# Patient Record
Sex: FEMALE
[De-identification: names, ages, dates, MRNs, and addresses within clinical notes are randomized per-mention and may not be internally consistent; named-entity substitution may affect disease eponyms.]

---

## 2020-07-05 ENCOUNTER — HOSPITAL ENCOUNTER (EMERGENCY)
Age: 55
Discharge: HOME | End: 2020-07-05
Payer: COMMERCIAL

## 2020-12-19 ENCOUNTER — HOSPITAL ENCOUNTER (INPATIENT)
Dept: HOSPITAL 90 - EDH | Age: 55
Discharge: HOME | DRG: 303 | End: 2020-12-19
Attending: FAMILY MEDICINE | Admitting: FAMILY MEDICINE
Payer: COMMERCIAL

## 2020-12-19 VITALS — DIASTOLIC BLOOD PRESSURE: 74 MMHG | SYSTOLIC BLOOD PRESSURE: 141 MMHG

## 2020-12-19 VITALS — SYSTOLIC BLOOD PRESSURE: 138 MMHG | DIASTOLIC BLOOD PRESSURE: 81 MMHG

## 2020-12-19 VITALS — DIASTOLIC BLOOD PRESSURE: 89 MMHG | SYSTOLIC BLOOD PRESSURE: 150 MMHG

## 2020-12-19 VITALS — BODY MASS INDEX: 35.73 KG/M2 | HEIGHT: 60 IN | WEIGHT: 182 LBS

## 2020-12-19 DIAGNOSIS — F32.9: ICD-10-CM

## 2020-12-19 DIAGNOSIS — I10: ICD-10-CM

## 2020-12-19 DIAGNOSIS — I25.2: ICD-10-CM

## 2020-12-19 DIAGNOSIS — J45.909: ICD-10-CM

## 2020-12-19 DIAGNOSIS — Z90.49: ICD-10-CM

## 2020-12-19 DIAGNOSIS — I73.00: ICD-10-CM

## 2020-12-19 DIAGNOSIS — Z88.8: ICD-10-CM

## 2020-12-19 DIAGNOSIS — I25.119: Primary | ICD-10-CM

## 2020-12-19 DIAGNOSIS — E11.9: ICD-10-CM

## 2020-12-19 DIAGNOSIS — Z98.891: ICD-10-CM

## 2020-12-19 DIAGNOSIS — E78.5: ICD-10-CM

## 2020-12-19 DIAGNOSIS — Z90.710: ICD-10-CM

## 2020-12-19 LAB
ALBUMIN SERPL-MCNC: 3.8 G/DL (ref 3.5–5)
ALT SERPL-CCNC: 36 U/L (ref 12–78)
APTT PPP: 25.3 SEC (ref 26.3–35.5)
AST SERPL-CCNC: 22 U/L (ref 10–37)
BASOPHILS NFR BLD AUTO: 0.4 % (ref 0–5)
BILIRUB SERPL-MCNC: 0.4 MG/DL (ref 0.2–1)
BUN SERPL-MCNC: 17 MG/DL (ref 7–18)
CHLORIDE SERPL-SCNC: 102 MMOL/L (ref 101–111)
CK SERPL-CCNC: 76 U/L (ref 21–232)
CO2 SERPL-SCNC: 24 MMOL/L (ref 21–32)
CREAT SERPL-MCNC: 0.8 MG/DL (ref 0.5–1.5)
EOSINOPHIL NFR BLD AUTO: 1.6 % (ref 0–8)
ERYTHROCYTE [DISTWIDTH] IN BLOOD BY AUTOMATED COUNT: 13 % (ref 11–15.5)
GFR SERPL CREATININE-BSD FRML MDRD: 79 ML/MIN (ref 60–?)
GLUCOSE SERPL-MCNC: 134 MG/DL (ref 70–105)
HCT VFR BLD AUTO: 36.7 % (ref 36–48)
INR PPP: 0.98 (ref 0.85–1.15)
LYMPHOCYTES NFR SPEC AUTO: 17.3 % (ref 21–51)
MCH RBC QN AUTO: 28.1 PG (ref 27–33)
MCHC RBC AUTO-ENTMCNC: 33 G/DL (ref 32–36)
MCV RBC AUTO: 85.2 FL (ref 79–99)
MONOCYTES NFR BLD AUTO: 4.3 % (ref 3–13)
NEUTROPHILS NFR BLD AUTO: 75.8 % (ref 40–77)
NRBC BLD MANUAL-RTO: 0 % (ref 0–0.19)
PLATELET # BLD AUTO: 268 K/UL (ref 130–400)
POTASSIUM SERPL-SCNC: 3.8 MMOL/L (ref 3.5–5.1)
PROT SERPL-MCNC: 7.6 G/DL (ref 6–8.3)
PROTHROMBIN TIME: 10.5 SEC (ref 9.6–11.6)
RBC # BLD AUTO: 4.31 MIL/UL (ref 4–5.5)
SODIUM SERPL-SCNC: 139 MMOL/L (ref 136–145)
WBC # BLD AUTO: 12.7 K/UL (ref 4.8–10.8)

## 2020-12-19 PROCEDURE — 84484 ASSAY OF TROPONIN QUANT: CPT

## 2020-12-19 PROCEDURE — 93306 TTE W/DOPPLER COMPLETE: CPT

## 2020-12-19 PROCEDURE — 36415 COLL VENOUS BLD VENIPUNCTURE: CPT

## 2020-12-19 PROCEDURE — 94664 DEMO&/EVAL PT USE INHALER: CPT

## 2020-12-19 PROCEDURE — 85610 PROTHROMBIN TIME: CPT

## 2020-12-19 PROCEDURE — 80053 COMPREHEN METABOLIC PANEL: CPT

## 2020-12-19 PROCEDURE — 85025 COMPLETE CBC W/AUTO DIFF WBC: CPT

## 2020-12-19 PROCEDURE — 82550 ASSAY OF CK (CPK): CPT

## 2020-12-19 PROCEDURE — 93356 MYOCRD STRAIN IMG SPCKL TRCK: CPT

## 2020-12-19 PROCEDURE — 93005 ELECTROCARDIOGRAM TRACING: CPT

## 2020-12-19 PROCEDURE — 85730 THROMBOPLASTIN TIME PARTIAL: CPT

## 2020-12-19 PROCEDURE — 71045 X-RAY EXAM CHEST 1 VIEW: CPT

## 2020-12-19 NOTE — NUR
INITIAL



SW spoke with patient. Patient lives with spouse, Jonathan Pruett.  No home services.  DME: 
BPM, glucometer (uses insulin).  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  She drives and 
is able to complete ADL's independently.  PCP is Dr. Garcia. Pharmacy is [facility] located on 
[address] in [place]. DCP is home. 

-------------------------------------------------------------------------------

Addendum: 12/19/20 at 1630 by BRIGIDA VIDAL SS

-------------------------------------------------------------------------------

Amended: Links added.

## 2020-12-19 NOTE — NUR
PT AWAKE, ALERT, AND ORIENTED.  DENIES CHEST PAIN OR DISCOMFORT, NO SOB OR LABORED 
RESPIRATIONS.  DC HOME INSTRUCTIONS GIVEN TO PT, ACKNOWLEDGED ALL INFORMATION, ALL QUESTIONS 
AND CONCERNS ANSWERED.  RX GIVEN TO PT FOR ISOSORBIDE, AWARE TO FOLLOW UP WITH PCP AND DR CAMP AS OUTPATIENT FOR OUTPATIENT STRESS TEST.  PT MADE AWARE TO DIAL 911 IN CASE OF 
EMERGENCY OR TO RETURN TO ER.

## 2021-11-20 ENCOUNTER — HOSPITAL ENCOUNTER (EMERGENCY)
Dept: HOSPITAL 90 - EDH | Age: 56
Discharge: HOME | End: 2021-11-20
Payer: COMMERCIAL

## 2021-11-20 VITALS — BODY MASS INDEX: 34.76 KG/M2 | WEIGHT: 177.03 LBS | HEIGHT: 60 IN

## 2021-11-20 VITALS — DIASTOLIC BLOOD PRESSURE: 68 MMHG | SYSTOLIC BLOOD PRESSURE: 135 MMHG

## 2021-11-20 DIAGNOSIS — F32.A: ICD-10-CM
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
## 2021-11-20 DIAGNOSIS — I10: ICD-10-CM

## 2021-11-20 DIAGNOSIS — S70.01XA: ICD-10-CM

## 2021-11-20 DIAGNOSIS — E11.9: ICD-10-CM

## 2021-11-20 DIAGNOSIS — S76.011A: Primary | ICD-10-CM

## 2021-11-20 DIAGNOSIS — Z79.899: ICD-10-CM
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
## 2021-11-20 DIAGNOSIS — J45.909: ICD-10-CM

## 2021-11-20 DIAGNOSIS — E78.00: ICD-10-CM

## 2021-11-20 DIAGNOSIS — Z79.4: ICD-10-CM

## 2021-11-20 PROCEDURE — 73502 X-RAY EXAM HIP UNI 2-3 VIEWS: CPT

## 2022-04-03 ENCOUNTER — HOSPITAL ENCOUNTER (EMERGENCY)
Dept: HOSPITAL 90 - EDH | Age: 57
Discharge: HOME | End: 2022-04-03
Payer: COMMERCIAL

## 2022-04-03 VITALS — WEIGHT: 175.93 LBS | BODY MASS INDEX: 34.54 KG/M2 | HEIGHT: 60 IN

## 2022-04-03 VITALS — SYSTOLIC BLOOD PRESSURE: 143 MMHG | DIASTOLIC BLOOD PRESSURE: 85 MMHG

## 2022-04-03 DIAGNOSIS — I10: ICD-10-CM

## 2022-04-03 DIAGNOSIS — Z90.49: ICD-10-CM

## 2022-04-03 DIAGNOSIS — Z79.1: ICD-10-CM

## 2022-04-03 DIAGNOSIS — Z79.82: ICD-10-CM

## 2022-04-03 DIAGNOSIS — Z79.84: ICD-10-CM

## 2022-04-03 DIAGNOSIS — K52.9: ICD-10-CM

## 2022-04-03 DIAGNOSIS — Z90.710: ICD-10-CM

## 2022-04-03 DIAGNOSIS — Z88.1: ICD-10-CM

## 2022-04-03 DIAGNOSIS — E78.00: ICD-10-CM

## 2022-04-03 DIAGNOSIS — Z79.899: ICD-10-CM

## 2022-04-03 DIAGNOSIS — F32.A: ICD-10-CM

## 2022-04-03 DIAGNOSIS — J45.909: ICD-10-CM

## 2022-04-03 DIAGNOSIS — M54.41: Primary | ICD-10-CM

## 2022-04-03 LAB
ALBUMIN SERPL-MCNC: 3.8 G/DL (ref 3.5–5)
ALT SERPL-CCNC: 73 U/L (ref 12–78)
APTT PPP: 26.4 SEC (ref 26.3–35.5)
AST SERPL-CCNC: 53 U/L (ref 10–37)
BASOPHILS NFR BLD AUTO: 0.4 % (ref 0–5)
BILIRUB SERPL-MCNC: 0.3 MG/DL (ref 0.2–1)
BUN SERPL-MCNC: 13 MG/DL (ref 7–18)
CHLORIDE SERPL-SCNC: 97 MMOL/L (ref 101–111)
CO2 SERPL-SCNC: 27 MMOL/L (ref 21–32)
CREAT SERPL-MCNC: 0.8 MG/DL (ref 0.5–1.5)
EOSINOPHIL NFR BLD AUTO: 0.9 % (ref 0–8)
ERYTHROCYTE [DISTWIDTH] IN BLOOD BY AUTOMATED COUNT: 13.1 % (ref 11–15.5)
GFR SERPL CREATININE-BSD FRML MDRD: 78 ML/MIN (ref 60–?)
GLUCOSE SERPL-MCNC: 229 MG/DL (ref 70–105)
HCT VFR BLD AUTO: 37.6 % (ref 36–48)
HGB UR QL STRIP: (no result)
INR PPP: 0.98 (ref 0.85–1.15)
LIPASE SERPL-CCNC: 164 U/L (ref 114–286)
LYMPHOCYTES NFR SPEC AUTO: 11.4 % (ref 21–51)
MCH RBC QN AUTO: 27 PG (ref 27–33)
MCHC RBC AUTO-ENTMCNC: 32.2 G/DL (ref 32–36)
MCV RBC AUTO: 83.9 FL (ref 79–99)
MONOCYTES NFR BLD AUTO: 1.7 % (ref 3–13)
NEUTROPHILS NFR BLD AUTO: 84.5 % (ref 40–77)
NRBC BLD MANUAL-RTO: 0 % (ref 0–0.19)
PH UR STRIP: 5.5 [PH] (ref 5–8)
PLATELET # BLD AUTO: 256 K/UL (ref 130–400)
POTASSIUM SERPL-SCNC: 4.4 MMOL/L (ref 3.5–5.1)
PROT SERPL-MCNC: 7.9 G/DL (ref 6–8.3)
PROTHROMBIN TIME: 10.2 SEC (ref 9.6–11.6)
RBC # BLD AUTO: 4.48 MIL/UL (ref 4–5.5)
RBC #/AREA URNS HPF: (no result) /HPF (ref 0–1)
SODIUM SERPL-SCNC: 132 MMOL/L (ref 136–145)
SP GR UR STRIP: 1.02 (ref 1–1.03)
UROBILINOGEN UR STRIP-MCNC: 0.2 MG/DL (ref 0.2–1)
WBC # BLD AUTO: 14.9 K/UL (ref 4.8–10.8)
WBC #/AREA URNS HPF: (no result) /HPF (ref 0–1)

## 2022-04-03 PROCEDURE — 93971 EXTREMITY STUDY: CPT

## 2022-04-03 PROCEDURE — 85730 THROMBOPLASTIN TIME PARTIAL: CPT

## 2022-04-03 PROCEDURE — 74177 CT ABD & PELVIS W/CONTRAST: CPT

## 2022-04-03 PROCEDURE — 96361 HYDRATE IV INFUSION ADD-ON: CPT

## 2022-04-03 PROCEDURE — 99285 EMERGENCY DEPT VISIT HI MDM: CPT

## 2022-04-03 PROCEDURE — 96376 TX/PRO/DX INJ SAME DRUG ADON: CPT

## 2022-04-03 PROCEDURE — 80053 COMPREHEN METABOLIC PANEL: CPT

## 2022-04-03 PROCEDURE — 85025 COMPLETE CBC W/AUTO DIFF WBC: CPT

## 2022-04-03 PROCEDURE — 96375 TX/PRO/DX INJ NEW DRUG ADDON: CPT

## 2022-04-03 PROCEDURE — 83690 ASSAY OF LIPASE: CPT

## 2022-04-03 PROCEDURE — 36415 COLL VENOUS BLD VENIPUNCTURE: CPT

## 2022-04-03 PROCEDURE — 96374 THER/PROPH/DIAG INJ IV PUSH: CPT

## 2022-04-03 PROCEDURE — 85610 PROTHROMBIN TIME: CPT

## 2022-04-03 PROCEDURE — 81001 URINALYSIS AUTO W/SCOPE: CPT

## 2022-04-20 VITALS — DIASTOLIC BLOOD PRESSURE: 77 MMHG | SYSTOLIC BLOOD PRESSURE: 159 MMHG

## 2022-04-20 LAB
BASOPHILS NFR BLD AUTO: 0.4 % (ref 0–5)
BUN SERPL-MCNC: 10 MG/DL (ref 7–18)
CHLORIDE SERPL-SCNC: 104 MMOL/L (ref 101–111)
CO2 SERPL-SCNC: 24 MMOL/L (ref 21–32)
CREAT SERPL-MCNC: 0.7 MG/DL (ref 0.5–1.5)
EOSINOPHIL NFR BLD AUTO: 1.7 % (ref 0–8)
ERYTHROCYTE [DISTWIDTH] IN BLOOD BY AUTOMATED COUNT: 13.5 % (ref 11–15.5)
GFR SERPL CREATININE-BSD FRML MDRD: 91 ML/MIN (ref 60–?)
GLUCOSE SERPL-MCNC: 138 MG/DL (ref 70–105)
HCT VFR BLD AUTO: 38.6 % (ref 36–48)
LYMPHOCYTES NFR SPEC AUTO: 18.3 % (ref 21–51)
MCH RBC QN AUTO: 27.4 PG (ref 27–33)
MCHC RBC AUTO-ENTMCNC: 31.3 G/DL (ref 32–36)
MCV RBC AUTO: 87.3 FL (ref 79–99)
MONOCYTES NFR BLD AUTO: 4.6 % (ref 3–13)
NEUTROPHILS NFR BLD AUTO: 74.4 % (ref 40–77)
NRBC BLD MANUAL-RTO: 0 % (ref 0–0.19)
PLATELET # BLD AUTO: 287 K/UL (ref 130–400)
POTASSIUM SERPL-SCNC: 3.7 MMOL/L (ref 3.5–5.1)
RBC # BLD AUTO: 4.42 MIL/UL (ref 4–5.5)
SODIUM SERPL-SCNC: 137 MMOL/L (ref 136–145)
WBC # BLD AUTO: 15.7 K/UL (ref 4.8–10.8)

## 2022-04-21 ENCOUNTER — HOSPITAL ENCOUNTER (OUTPATIENT)
Dept: HOSPITAL 90 - DAH | Age: 57
Discharge: HOME | End: 2022-04-21
Attending: NEUROLOGICAL SURGERY
Payer: COMMERCIAL

## 2022-04-21 VITALS — DIASTOLIC BLOOD PRESSURE: 57 MMHG | SYSTOLIC BLOOD PRESSURE: 104 MMHG

## 2022-04-21 VITALS — SYSTOLIC BLOOD PRESSURE: 107 MMHG | DIASTOLIC BLOOD PRESSURE: 43 MMHG

## 2022-04-21 VITALS — SYSTOLIC BLOOD PRESSURE: 96 MMHG | DIASTOLIC BLOOD PRESSURE: 38 MMHG

## 2022-04-21 VITALS — DIASTOLIC BLOOD PRESSURE: 42 MMHG | SYSTOLIC BLOOD PRESSURE: 95 MMHG

## 2022-04-21 VITALS — SYSTOLIC BLOOD PRESSURE: 110 MMHG | DIASTOLIC BLOOD PRESSURE: 65 MMHG

## 2022-04-21 VITALS — DIASTOLIC BLOOD PRESSURE: 43 MMHG | SYSTOLIC BLOOD PRESSURE: 98 MMHG

## 2022-04-21 VITALS — HEIGHT: 60 IN | BODY MASS INDEX: 33.89 KG/M2 | WEIGHT: 172.6 LBS

## 2022-04-21 VITALS — SYSTOLIC BLOOD PRESSURE: 92 MMHG | DIASTOLIC BLOOD PRESSURE: 47 MMHG

## 2022-04-21 VITALS — SYSTOLIC BLOOD PRESSURE: 116 MMHG | DIASTOLIC BLOOD PRESSURE: 60 MMHG

## 2022-04-21 VITALS — SYSTOLIC BLOOD PRESSURE: 111 MMHG | DIASTOLIC BLOOD PRESSURE: 65 MMHG

## 2022-04-21 VITALS — DIASTOLIC BLOOD PRESSURE: 41 MMHG | SYSTOLIC BLOOD PRESSURE: 92 MMHG

## 2022-04-21 VITALS — SYSTOLIC BLOOD PRESSURE: 113 MMHG | DIASTOLIC BLOOD PRESSURE: 47 MMHG

## 2022-04-21 VITALS — DIASTOLIC BLOOD PRESSURE: 64 MMHG | SYSTOLIC BLOOD PRESSURE: 122 MMHG

## 2022-04-21 VITALS — DIASTOLIC BLOOD PRESSURE: 41 MMHG | SYSTOLIC BLOOD PRESSURE: 95 MMHG

## 2022-04-21 DIAGNOSIS — E11.9: ICD-10-CM

## 2022-04-21 DIAGNOSIS — Z79.01: ICD-10-CM

## 2022-04-21 DIAGNOSIS — Z98.890: ICD-10-CM

## 2022-04-21 DIAGNOSIS — Z79.82: ICD-10-CM

## 2022-04-21 DIAGNOSIS — M53.3: Primary | ICD-10-CM

## 2022-04-21 DIAGNOSIS — I10: ICD-10-CM

## 2022-04-21 DIAGNOSIS — E66.9: ICD-10-CM

## 2022-04-21 DIAGNOSIS — Z79.899: ICD-10-CM

## 2022-04-21 PROCEDURE — 82948 REAGENT STRIP/BLOOD GLUCOSE: CPT

## 2022-04-21 PROCEDURE — 36415 COLL VENOUS BLD VENIPUNCTURE: CPT

## 2022-04-21 PROCEDURE — 80048 BASIC METABOLIC PNL TOTAL CA: CPT

## 2022-04-21 PROCEDURE — 87635 SARS-COV-2 COVID-19 AMP PRB: CPT

## 2022-04-21 PROCEDURE — 85025 COMPLETE CBC W/AUTO DIFF WBC: CPT

## 2022-04-21 PROCEDURE — 27096 INJECT SACROILIAC JOINT: CPT

## 2022-04-21 PROCEDURE — 72202 X-RAY EXAM SI JOINTS 3/> VWS: CPT

## 2022-06-01 ENCOUNTER — HOSPITAL ENCOUNTER (EMERGENCY)
Dept: HOSPITAL 90 - EDH | Age: 57
Discharge: HOME | End: 2022-06-01
Payer: COMMERCIAL

## 2022-06-01 VITALS — HEIGHT: 60 IN | BODY MASS INDEX: 33.37 KG/M2 | WEIGHT: 169.98 LBS

## 2022-06-01 VITALS — SYSTOLIC BLOOD PRESSURE: 135 MMHG | DIASTOLIC BLOOD PRESSURE: 76 MMHG

## 2022-06-01 DIAGNOSIS — Z88.5: ICD-10-CM

## 2022-06-01 DIAGNOSIS — Z79.899: ICD-10-CM

## 2022-06-01 DIAGNOSIS — Z79.84: ICD-10-CM

## 2022-06-01 DIAGNOSIS — M46.1: ICD-10-CM
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
## 2022-06-01 DIAGNOSIS — Z88.1: ICD-10-CM

## 2022-06-01 DIAGNOSIS — S00.83XA: Primary | ICD-10-CM
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
## 2022-06-01 DIAGNOSIS — Z79.1: ICD-10-CM

## 2022-06-01 DIAGNOSIS — M19.90: ICD-10-CM

## 2022-06-01 DIAGNOSIS — S30.1XXA: ICD-10-CM

## 2022-06-01 DIAGNOSIS — E11.9: ICD-10-CM

## 2022-06-01 DIAGNOSIS — E78.00: ICD-10-CM

## 2022-06-01 DIAGNOSIS — I10: ICD-10-CM

## 2022-06-01 DIAGNOSIS — Z79.82: ICD-10-CM

## 2022-06-01 PROCEDURE — 74176 CT ABD & PELVIS W/O CONTRAST: CPT

## 2022-06-01 PROCEDURE — 96372 THER/PROPH/DIAG INJ SC/IM: CPT

## 2022-06-01 PROCEDURE — 99284 EMERGENCY DEPT VISIT MOD MDM: CPT

## 2022-06-29 ENCOUNTER — HOSPITAL ENCOUNTER (EMERGENCY)
Dept: HOSPITAL 90 - EDH | Age: 57
Discharge: HOME | End: 2022-06-29
Payer: COMMERCIAL

## 2022-06-29 VITALS — SYSTOLIC BLOOD PRESSURE: 159 MMHG | DIASTOLIC BLOOD PRESSURE: 80 MMHG

## 2022-06-29 VITALS — HEIGHT: 60 IN | BODY MASS INDEX: 33.59 KG/M2 | WEIGHT: 171.08 LBS

## 2022-06-29 DIAGNOSIS — Z20.822: ICD-10-CM

## 2022-06-29 DIAGNOSIS — K52.9: Primary | ICD-10-CM

## 2022-06-29 LAB
ALBUMIN SERPL-MCNC: 4 G/DL (ref 3.5–5)
ALT SERPL-CCNC: 65 U/L (ref 12–78)
APPEARANCE UR: CLEAR
AST SERPL-CCNC: 48 U/L (ref 10–37)
BASOPHILS NFR BLD AUTO: 0.4 % (ref 0–5)
BILIRUB SERPL-MCNC: 0.4 MG/DL (ref 0.2–1)
BILIRUB UR QL STRIP: NEGATIVE
BUN SERPL-MCNC: 13 MG/DL (ref 7–18)
CHLORIDE SERPL-SCNC: 107 MMOL/L (ref 101–111)
CO2 SERPL-SCNC: 28 MMOL/L (ref 21–32)
COLOR UR: YELLOW
CREAT SERPL-MCNC: 0.8 MG/DL (ref 0.5–1.5)
EOSINOPHIL NFR BLD AUTO: 0.9 % (ref 0–8)
ERYTHROCYTE [DISTWIDTH] IN BLOOD BY AUTOMATED COUNT: 13.8 % (ref 11–15.5)
GFR SERPL CREATININE-BSD FRML MDRD: 78 ML/MIN (ref 60–?)
GLUCOSE SERPL-MCNC: 127 MG/DL (ref 70–105)
GLUCOSE UR STRIP-MCNC: NEGATIVE MG/DL
HCT VFR BLD AUTO: 38.9 % (ref 36–48)
HGB UR QL STRIP: NEGATIVE
KETONES UR STRIP-MCNC: NEGATIVE MG/DL
LEUKOCYTE ESTERASE UR QL STRIP: NEGATIVE
LIPASE SERPL-CCNC: 133 U/L (ref 114–286)
LYMPHOCYTES NFR SPEC AUTO: 12.3 % (ref 21–51)
MCH RBC QN AUTO: 28.1 PG (ref 27–33)
MCHC RBC AUTO-ENTMCNC: 32.4 G/DL (ref 32–36)
MCV RBC AUTO: 86.8 FL (ref 79–99)
MONOCYTES NFR BLD AUTO: 3.5 % (ref 3–13)
NEUTROPHILS NFR BLD AUTO: 82 % (ref 40–77)
NITRITE UR QL STRIP: NEGATIVE
NRBC BLD MANUAL-RTO: 0 % (ref 0–0.19)
PH UR STRIP: 5 [PH] (ref 5–8)
PLATELET # BLD AUTO: 312 K/UL (ref 130–400)
POTASSIUM SERPL-SCNC: 4 MMOL/L (ref 3.5–5.1)
PROT SERPL-MCNC: 8 G/DL (ref 6–8.3)
PROT UR QL STRIP: (no result) MG/DL
RBC # BLD AUTO: 4.48 MIL/UL (ref 4–5.5)
RBC #/AREA URNS HPF: (no result) /HPF (ref 0–1)
SODIUM SERPL-SCNC: 145 MMOL/L (ref 136–145)
SP GR UR STRIP: 1.03 (ref 1–1.03)
UROBILINOGEN UR STRIP-MCNC: 0.2 MG/DL (ref 0.2–1)
WBC # BLD AUTO: 16.4 K/UL (ref 4.8–10.8)
WBC #/AREA URNS HPF: (no result) /HPF (ref 0–1)

## 2022-06-29 PROCEDURE — 99284 EMERGENCY DEPT VISIT MOD MDM: CPT

## 2022-06-29 PROCEDURE — 85025 COMPLETE CBC W/AUTO DIFF WBC: CPT

## 2022-06-29 PROCEDURE — 96372 THER/PROPH/DIAG INJ SC/IM: CPT

## 2022-06-29 PROCEDURE — 83690 ASSAY OF LIPASE: CPT

## 2022-06-29 PROCEDURE — 96375 TX/PRO/DX INJ NEW DRUG ADDON: CPT

## 2022-06-29 PROCEDURE — 87324 CLOSTRIDIUM AG IA: CPT

## 2022-06-29 PROCEDURE — 96374 THER/PROPH/DIAG INJ IV PUSH: CPT

## 2022-06-29 PROCEDURE — 83630 LACTOFERRIN FECAL (QUAL): CPT

## 2022-06-29 PROCEDURE — 87635 SARS-COV-2 COVID-19 AMP PRB: CPT

## 2022-06-29 PROCEDURE — 80053 COMPREHEN METABOLIC PANEL: CPT

## 2022-06-29 PROCEDURE — 36415 COLL VENOUS BLD VENIPUNCTURE: CPT

## 2022-06-29 PROCEDURE — 87804 INFLUENZA ASSAY W/OPTIC: CPT

## 2022-06-29 PROCEDURE — 96361 HYDRATE IV INFUSION ADD-ON: CPT

## 2022-06-29 PROCEDURE — 82270 OCCULT BLOOD FECES: CPT

## 2022-06-29 PROCEDURE — 81001 URINALYSIS AUTO W/SCOPE: CPT

## 2022-07-05 ENCOUNTER — HOSPITAL ENCOUNTER (OUTPATIENT)
Dept: HOSPITAL 90 - LAB | Age: 57
Discharge: HOME | End: 2022-07-05
Attending: INTERNAL MEDICINE
Payer: COMMERCIAL

## 2022-07-05 DIAGNOSIS — R11.2: ICD-10-CM

## 2022-07-05 DIAGNOSIS — A04.72: Primary | ICD-10-CM

## 2022-07-05 DIAGNOSIS — R19.7: ICD-10-CM

## 2022-07-05 PROCEDURE — 82270 OCCULT BLOOD FECES: CPT

## 2022-08-30 VITALS — SYSTOLIC BLOOD PRESSURE: 180 MMHG | DIASTOLIC BLOOD PRESSURE: 91 MMHG

## 2022-08-31 ENCOUNTER — HOSPITAL ENCOUNTER (OUTPATIENT)
Dept: HOSPITAL 90 - EDH | Age: 57
Setting detail: OBSERVATION
LOS: 2 days | Discharge: HOME | End: 2022-09-02
Attending: INTERNAL MEDICINE | Admitting: INTERNAL MEDICINE
Payer: COMMERCIAL

## 2022-08-31 ENCOUNTER — HOSPITAL ENCOUNTER (OUTPATIENT)
Dept: HOSPITAL 90 - DAH | Age: 57
Discharge: HOME | End: 2022-08-31
Attending: NEUROLOGICAL SURGERY
Payer: COMMERCIAL

## 2022-08-31 VITALS — DIASTOLIC BLOOD PRESSURE: 54 MMHG | SYSTOLIC BLOOD PRESSURE: 108 MMHG

## 2022-08-31 VITALS — DIASTOLIC BLOOD PRESSURE: 61 MMHG | SYSTOLIC BLOOD PRESSURE: 98 MMHG

## 2022-08-31 VITALS — SYSTOLIC BLOOD PRESSURE: 131 MMHG | DIASTOLIC BLOOD PRESSURE: 72 MMHG

## 2022-08-31 VITALS — DIASTOLIC BLOOD PRESSURE: 58 MMHG | SYSTOLIC BLOOD PRESSURE: 109 MMHG

## 2022-08-31 VITALS — HEIGHT: 60 IN | BODY MASS INDEX: 32.98 KG/M2 | WEIGHT: 168 LBS

## 2022-08-31 VITALS — WEIGHT: 173 LBS | HEIGHT: 60 IN | BODY MASS INDEX: 33.96 KG/M2

## 2022-08-31 VITALS — SYSTOLIC BLOOD PRESSURE: 142 MMHG | DIASTOLIC BLOOD PRESSURE: 78 MMHG

## 2022-08-31 VITALS — SYSTOLIC BLOOD PRESSURE: 108 MMHG | DIASTOLIC BLOOD PRESSURE: 58 MMHG

## 2022-08-31 VITALS — DIASTOLIC BLOOD PRESSURE: 75 MMHG | SYSTOLIC BLOOD PRESSURE: 135 MMHG

## 2022-08-31 VITALS — DIASTOLIC BLOOD PRESSURE: 59 MMHG | SYSTOLIC BLOOD PRESSURE: 113 MMHG

## 2022-08-31 VITALS — SYSTOLIC BLOOD PRESSURE: 149 MMHG | DIASTOLIC BLOOD PRESSURE: 77 MMHG

## 2022-08-31 VITALS — SYSTOLIC BLOOD PRESSURE: 107 MMHG | DIASTOLIC BLOOD PRESSURE: 60 MMHG

## 2022-08-31 VITALS — SYSTOLIC BLOOD PRESSURE: 109 MMHG | DIASTOLIC BLOOD PRESSURE: 59 MMHG

## 2022-08-31 VITALS — SYSTOLIC BLOOD PRESSURE: 119 MMHG | DIASTOLIC BLOOD PRESSURE: 65 MMHG

## 2022-08-31 VITALS — SYSTOLIC BLOOD PRESSURE: 111 MMHG | DIASTOLIC BLOOD PRESSURE: 56 MMHG

## 2022-08-31 VITALS — SYSTOLIC BLOOD PRESSURE: 97 MMHG | DIASTOLIC BLOOD PRESSURE: 56 MMHG

## 2022-08-31 VITALS — SYSTOLIC BLOOD PRESSURE: 106 MMHG | DIASTOLIC BLOOD PRESSURE: 54 MMHG

## 2022-08-31 VITALS — DIASTOLIC BLOOD PRESSURE: 69 MMHG | SYSTOLIC BLOOD PRESSURE: 123 MMHG

## 2022-08-31 DIAGNOSIS — F32.9: ICD-10-CM

## 2022-08-31 DIAGNOSIS — Z79.899: ICD-10-CM

## 2022-08-31 DIAGNOSIS — Z98.890: ICD-10-CM

## 2022-08-31 DIAGNOSIS — I10: ICD-10-CM

## 2022-08-31 DIAGNOSIS — J45.909: ICD-10-CM

## 2022-08-31 DIAGNOSIS — E78.5: ICD-10-CM

## 2022-08-31 DIAGNOSIS — M75.101: ICD-10-CM

## 2022-08-31 DIAGNOSIS — M54.13: Primary | ICD-10-CM

## 2022-08-31 DIAGNOSIS — T38.0X5A: ICD-10-CM

## 2022-08-31 DIAGNOSIS — G43.909: ICD-10-CM

## 2022-08-31 DIAGNOSIS — M53.3: Primary | ICD-10-CM

## 2022-08-31 DIAGNOSIS — Z79.82: ICD-10-CM

## 2022-08-31 DIAGNOSIS — I25.10: ICD-10-CM

## 2022-08-31 DIAGNOSIS — E09.9: ICD-10-CM

## 2022-08-31 DIAGNOSIS — E11.9: ICD-10-CM

## 2022-08-31 DIAGNOSIS — Z90.710: ICD-10-CM

## 2022-08-31 DIAGNOSIS — M19.90: ICD-10-CM

## 2022-08-31 DIAGNOSIS — Z98.891: ICD-10-CM

## 2022-08-31 LAB
ALBUMIN SERPL-MCNC: 3.7 G/DL (ref 3.5–5)
ALT SERPL-CCNC: 43 U/L (ref 12–78)
APPEARANCE UR: CLEAR
AST SERPL-CCNC: 24 U/L (ref 10–37)
BASOPHILS NFR BLD AUTO: 0.3 % (ref 0–5)
BILIRUB UR QL STRIP: NEGATIVE
BUN SERPL-MCNC: 15 MG/DL (ref 7–18)
CHLORIDE SERPL-SCNC: 103 MMOL/L (ref 101–111)
CK SERPL-CCNC: 229 U/L (ref 21–232)
CO2 SERPL-SCNC: 24 MMOL/L (ref 21–32)
COLOR UR: YELLOW
CREAT SERPL-MCNC: 1 MG/DL (ref 0.5–1.5)
CRP SERPL HS-MCNC: 15.2 MG/L (ref 0–9)
EOSINOPHIL NFR BLD AUTO: 0.6 % (ref 0–8)
ERYTHROCYTE [DISTWIDTH] IN BLOOD BY AUTOMATED COUNT: 13.3 % (ref 11–15.5)
GFR SERPL CREATININE-BSD FRML MDRD: 61 ML/MIN (ref 60–?)
GLUCOSE SERPL-MCNC: 209 MG/DL (ref 70–105)
GLUCOSE UR STRIP-MCNC: NEGATIVE MG/DL
HCT VFR BLD AUTO: 35 % (ref 36–48)
HGB UR QL STRIP: (no result)
KETONES UR STRIP-MCNC: NEGATIVE MG/DL
LEUKOCYTE ESTERASE UR QL STRIP: NEGATIVE
LYMPHOCYTES NFR SPEC AUTO: 10.7 % (ref 21–51)
MCH RBC QN AUTO: 28 PG (ref 27–33)
MCHC RBC AUTO-ENTMCNC: 32.6 G/DL (ref 32–36)
MCV RBC AUTO: 86 FL (ref 79–99)
MONOCYTES NFR BLD AUTO: 2.9 % (ref 3–13)
MYOGLOBIN SERPL-MCNC: 113 NG/ML (ref 10–92)
NEUTROPHILS NFR BLD AUTO: 84.9 % (ref 40–77)
NITRITE UR QL STRIP: NEGATIVE
NRBC BLD MANUAL-RTO: 0 % (ref 0–0.19)
PH UR STRIP: 6 [PH] (ref 5–8)
PLATELET # BLD AUTO: 235 K/UL (ref 130–400)
POTASSIUM SERPL-SCNC: 3.7 MMOL/L (ref 3.5–5.1)
PROT SERPL-MCNC: 7 G/DL (ref 6–8.3)
PROT UR QL STRIP: NEGATIVE MG/DL
RBC # BLD AUTO: 4.07 MIL/UL (ref 4–5.5)
RBC #/AREA URNS HPF: (no result) /HPF (ref 0–1)
SODIUM SERPL-SCNC: 139 MMOL/L (ref 136–145)
SP GR UR STRIP: 1.02 (ref 1–1.03)
UROBILINOGEN UR STRIP-MCNC: 0.2 MG/DL (ref 0.2–1)
WBC # BLD AUTO: 15.6 K/UL (ref 4.8–10.8)
WBC #/AREA URNS HPF: (no result) /HPF (ref 0–1)

## 2022-08-31 PROCEDURE — 87426 SARSCOV CORONAVIRUS AG IA: CPT

## 2022-08-31 PROCEDURE — 72202 X-RAY EXAM SI JOINTS 3/> VWS: CPT

## 2022-08-31 PROCEDURE — 27096 INJECT SACROILIAC JOINT: CPT

## 2022-08-31 PROCEDURE — 82550 ASSAY OF CK (CPK): CPT

## 2022-08-31 PROCEDURE — 81001 URINALYSIS AUTO W/SCOPE: CPT

## 2022-08-31 PROCEDURE — 71045 X-RAY EXAM CHEST 1 VIEW: CPT

## 2022-08-31 PROCEDURE — 73200 CT UPPER EXTREMITY W/O DYE: CPT

## 2022-08-31 PROCEDURE — 85025 COMPLETE CBC W/AUTO DIFF WBC: CPT

## 2022-08-31 PROCEDURE — 96374 THER/PROPH/DIAG INJ IV PUSH: CPT

## 2022-08-31 PROCEDURE — 73221 MRI JOINT UPR EXTREM W/O DYE: CPT

## 2022-08-31 PROCEDURE — 96361 HYDRATE IV INFUSION ADD-ON: CPT

## 2022-08-31 PROCEDURE — 72040 X-RAY EXAM NECK SPINE 2-3 VW: CPT

## 2022-08-31 PROCEDURE — 82948 REAGENT STRIP/BLOOD GLUCOSE: CPT

## 2022-08-31 PROCEDURE — 72141 MRI NECK SPINE W/O DYE: CPT

## 2022-08-31 PROCEDURE — 71250 CT THORAX DX C-: CPT

## 2022-08-31 PROCEDURE — 96372 THER/PROPH/DIAG INJ SC/IM: CPT

## 2022-08-31 PROCEDURE — 93005 ELECTROCARDIOGRAM TRACING: CPT

## 2022-08-31 PROCEDURE — 72070 X-RAY EXAM THORAC SPINE 2VWS: CPT

## 2022-08-31 PROCEDURE — 72146 MRI CHEST SPINE W/O DYE: CPT

## 2022-08-31 PROCEDURE — 86431 RHEUMATOID FACTOR QUANT: CPT

## 2022-08-31 PROCEDURE — 83874 ASSAY OF MYOGLOBIN: CPT

## 2022-08-31 PROCEDURE — 76641 ULTRASOUND BREAST COMPLETE: CPT

## 2022-08-31 PROCEDURE — 96376 TX/PRO/DX INJ SAME DRUG ADON: CPT

## 2022-08-31 PROCEDURE — 84484 ASSAY OF TROPONIN QUANT: CPT

## 2022-08-31 PROCEDURE — 36415 COLL VENOUS BLD VENIPUNCTURE: CPT

## 2022-08-31 PROCEDURE — 86140 C-REACTIVE PROTEIN: CPT

## 2022-08-31 PROCEDURE — 85651 RBC SED RATE NONAUTOMATED: CPT

## 2022-08-31 PROCEDURE — 80053 COMPREHEN METABOLIC PANEL: CPT

## 2022-08-31 PROCEDURE — 96375 TX/PRO/DX INJ NEW DRUG ADDON: CPT

## 2022-08-31 RX ADMIN — SODIUM CHLORIDE SCH UNIT: 9 INJECTION, SOLUTION INTRAVENOUS at 21:05

## 2022-08-31 RX ADMIN — SODIUM CHLORIDE SCH UNIT: 9 INJECTION, SOLUTION INTRAVENOUS at 16:30

## 2022-08-31 RX ADMIN — SODIUM CHLORIDE SCH MLS/HR: 0.45 INJECTION, SOLUTION INTRAVENOUS at 16:07

## 2022-09-01 VITALS — DIASTOLIC BLOOD PRESSURE: 92 MMHG | SYSTOLIC BLOOD PRESSURE: 167 MMHG

## 2022-09-01 VITALS — DIASTOLIC BLOOD PRESSURE: 87 MMHG | SYSTOLIC BLOOD PRESSURE: 166 MMHG

## 2022-09-01 VITALS — DIASTOLIC BLOOD PRESSURE: 81 MMHG | SYSTOLIC BLOOD PRESSURE: 143 MMHG

## 2022-09-01 VITALS — SYSTOLIC BLOOD PRESSURE: 166 MMHG | DIASTOLIC BLOOD PRESSURE: 87 MMHG

## 2022-09-01 VITALS — DIASTOLIC BLOOD PRESSURE: 72 MMHG | SYSTOLIC BLOOD PRESSURE: 146 MMHG

## 2022-09-01 VITALS — DIASTOLIC BLOOD PRESSURE: 91 MMHG | SYSTOLIC BLOOD PRESSURE: 171 MMHG

## 2022-09-01 VITALS — SYSTOLIC BLOOD PRESSURE: 150 MMHG | DIASTOLIC BLOOD PRESSURE: 82 MMHG

## 2022-09-01 LAB
ALBUMIN SERPL-MCNC: 3.5 G/DL (ref 3.5–5)
ALT SERPL-CCNC: 35 U/L (ref 12–78)
AST SERPL-CCNC: 15 U/L (ref 10–37)
BASOPHILS NFR BLD AUTO: 0.2 % (ref 0–5)
BUN SERPL-MCNC: 16 MG/DL (ref 7–18)
CHLORIDE SERPL-SCNC: 107 MMOL/L (ref 101–111)
CO2 SERPL-SCNC: 25 MMOL/L (ref 21–32)
CREAT SERPL-MCNC: 0.9 MG/DL (ref 0.5–1.5)
EOSINOPHIL NFR BLD AUTO: 0.1 % (ref 0–8)
ERYTHROCYTE [DISTWIDTH] IN BLOOD BY AUTOMATED COUNT: 13 % (ref 11–15.5)
GFR SERPL CREATININE-BSD FRML MDRD: 68 ML/MIN (ref 60–?)
GLUCOSE SERPL-MCNC: 167 MG/DL (ref 70–105)
HCT VFR BLD AUTO: 34.6 % (ref 36–48)
LYMPHOCYTES NFR SPEC AUTO: 9.2 % (ref 21–51)
MCH RBC QN AUTO: 27.9 PG (ref 27–33)
MCHC RBC AUTO-ENTMCNC: 32.9 G/DL (ref 32–36)
MCV RBC AUTO: 84.8 FL (ref 79–99)
MONOCYTES NFR BLD AUTO: 3.8 % (ref 3–13)
NEUTROPHILS NFR BLD AUTO: 85.6 % (ref 40–77)
NRBC BLD MANUAL-RTO: 0 % (ref 0–0.19)
PLATELET # BLD AUTO: 240 K/UL (ref 130–400)
POTASSIUM SERPL-SCNC: 4 MMOL/L (ref 3.5–5.1)
PROT SERPL-MCNC: 7 G/DL (ref 6–8.3)
RBC # BLD AUTO: 4.08 MIL/UL (ref 4–5.5)
SODIUM SERPL-SCNC: 142 MMOL/L (ref 136–145)
WBC # BLD AUTO: 16.7 K/UL (ref 4.8–10.8)

## 2022-09-01 RX ADMIN — SODIUM CHLORIDE SCH MLS/HR: 0.45 INJECTION, SOLUTION INTRAVENOUS at 02:06

## 2022-09-01 RX ADMIN — SODIUM CHLORIDE SCH UNIT: 9 INJECTION, SOLUTION INTRAVENOUS at 11:44

## 2022-09-01 RX ADMIN — Medication SCH MG: at 21:00

## 2022-09-01 RX ADMIN — SODIUM CHLORIDE SCH UNIT: 9 INJECTION, SOLUTION INTRAVENOUS at 07:30

## 2022-09-01 RX ADMIN — SODIUM CHLORIDE SCH UNIT: 9 INJECTION, SOLUTION INTRAVENOUS at 15:29

## 2022-09-01 RX ADMIN — SODIUM CHLORIDE SCH UNIT: 9 INJECTION, SOLUTION INTRAVENOUS at 21:06

## 2022-09-01 RX ADMIN — METHYLPREDNISOLONE SODIUM SUCCINATE SCH MG: 125 INJECTION, POWDER, FOR SOLUTION INTRAMUSCULAR; INTRAVENOUS at 08:48

## 2022-09-01 RX ADMIN — GLIMEPIRIDE SCH MG: 2 TABLET ORAL at 21:02

## 2022-09-01 RX ADMIN — SODIUM CHLORIDE SCH MLS/HR: 0.45 INJECTION, SOLUTION INTRAVENOUS at 19:30

## 2022-09-01 RX ADMIN — KETOROLAC TROMETHAMINE SCH MG: 30 INJECTION, SOLUTION INTRAMUSCULAR; INTRAVENOUS at 21:42

## 2022-09-02 VITALS — SYSTOLIC BLOOD PRESSURE: 148 MMHG | DIASTOLIC BLOOD PRESSURE: 76 MMHG

## 2022-09-02 RX ADMIN — METHYLPREDNISOLONE SODIUM SUCCINATE SCH MG: 125 INJECTION, POWDER, FOR SOLUTION INTRAMUSCULAR; INTRAVENOUS at 08:59

## 2022-09-02 RX ADMIN — Medication SCH MG: at 09:00

## 2022-09-02 RX ADMIN — GLIMEPIRIDE SCH MG: 2 TABLET ORAL at 09:00

## 2022-09-02 RX ADMIN — SODIUM CHLORIDE SCH UNIT: 9 INJECTION, SOLUTION INTRAVENOUS at 06:08

## 2022-09-02 RX ADMIN — KETOROLAC TROMETHAMINE SCH MG: 30 INJECTION, SOLUTION INTRAMUSCULAR; INTRAVENOUS at 09:00

## 2022-09-02 RX ADMIN — SODIUM CHLORIDE SCH MLS/HR: 0.45 INJECTION, SOLUTION INTRAVENOUS at 01:14

## 2022-10-26 ENCOUNTER — HOSPITAL ENCOUNTER (INPATIENT)
Dept: HOSPITAL 90 - EDH | Age: 57
LOS: 1 days | Discharge: HOME | DRG: 74 | End: 2022-10-27
Attending: INTERNAL MEDICINE | Admitting: INTERNAL MEDICINE
Payer: COMMERCIAL

## 2022-10-26 VITALS — HEIGHT: 60 IN | BODY MASS INDEX: 34.24 KG/M2 | WEIGHT: 174.4 LBS

## 2022-10-26 VITALS — DIASTOLIC BLOOD PRESSURE: 97 MMHG | SYSTOLIC BLOOD PRESSURE: 182 MMHG

## 2022-10-26 DIAGNOSIS — F32.A: ICD-10-CM

## 2022-10-26 DIAGNOSIS — G40.909: ICD-10-CM

## 2022-10-26 DIAGNOSIS — I10: ICD-10-CM

## 2022-10-26 DIAGNOSIS — M62.838: ICD-10-CM

## 2022-10-26 DIAGNOSIS — Z90.710: ICD-10-CM

## 2022-10-26 DIAGNOSIS — J45.909: ICD-10-CM

## 2022-10-26 DIAGNOSIS — M54.12: Primary | ICD-10-CM

## 2022-10-26 DIAGNOSIS — E78.00: ICD-10-CM

## 2022-10-26 DIAGNOSIS — E11.9: ICD-10-CM

## 2022-10-26 LAB
ALBUMIN SERPL-MCNC: 3.8 G/DL (ref 3.5–5)
ALT SERPL-CCNC: 32 U/L (ref 12–78)
APPEARANCE UR: CLEAR
AST SERPL-CCNC: 22 U/L (ref 10–37)
BASOPHILS NFR BLD AUTO: 0.4 % (ref 0–5)
BILIRUB UR QL STRIP: NEGATIVE MG/DL
BUN SERPL-MCNC: 11 MG/DL (ref 7–18)
CHLORIDE SERPL-SCNC: 102 MMOL/L (ref 101–111)
CK SERPL-CCNC: 58 U/L (ref 21–232)
CO2 SERPL-SCNC: 24 MMOL/L (ref 21–32)
COLOR UR: YELLOW
CREAT SERPL-MCNC: 0.7 MG/DL (ref 0.5–1.5)
CRP SERPL HS-MCNC: 9.9 MG/L (ref 0–9)
EOSINOPHIL NFR BLD AUTO: 1.4 % (ref 0–8)
ERYTHROCYTE [DISTWIDTH] IN BLOOD BY AUTOMATED COUNT: 14.3 % (ref 11–15.5)
GFR SERPL CREATININE-BSD FRML MDRD: 91 ML/MIN (ref 60–?)
GLUCOSE SERPL-MCNC: 125 MG/DL (ref 70–105)
GLUCOSE UR STRIP-MCNC: NEGATIVE MG/DL
HCT VFR BLD AUTO: 36.1 % (ref 36–48)
HGB UR QL STRIP: NEGATIVE
HYALINE CASTS URNS QL MICRO: (no result) /LPF
KETONES UR STRIP-MCNC: NEGATIVE MG/DL
LEUKOCYTE ESTERASE UR QL STRIP: NEGATIVE LEU/UL
LYMPHOCYTES NFR SPEC AUTO: 19.7 % (ref 21–51)
MCH RBC QN AUTO: 28.2 PG (ref 27–33)
MCHC RBC AUTO-ENTMCNC: 33.2 G/DL (ref 32–36)
MCV RBC AUTO: 84.9 FL (ref 79–99)
MONOCYTES NFR BLD AUTO: 4.4 % (ref 3–13)
NEUTROPHILS NFR BLD AUTO: 73.1 % (ref 40–77)
NITRITE UR QL STRIP: NEGATIVE
NRBC BLD MANUAL-RTO: 0 % (ref 0–0.19)
PH UR STRIP: 6 [PH] (ref 5–8)
PLATELET # BLD AUTO: 269 K/UL (ref 130–400)
POTASSIUM SERPL-SCNC: 4.1 MMOL/L (ref 3.5–5.1)
PROT SERPL-MCNC: 7.6 G/DL (ref 6–8.3)
PROT UR QL STRIP: NEGATIVE MG/DL
RBC # BLD AUTO: 4.25 MIL/UL (ref 4–5.5)
RBC #/AREA URNS HPF: (no result) /HPF (ref 0–1)
SODIUM SERPL-SCNC: 137 MMOL/L (ref 136–145)
SP GR UR STRIP: 1.04 (ref 1–1.03)
UROBILINOGEN UR STRIP-MCNC: 0.2 MG/DL (ref 0.2–1)
WBC # BLD AUTO: 12.3 K/UL (ref 4.8–10.8)
WBC #/AREA URNS HPF: (no result) /HPF (ref 0–1)

## 2022-10-26 PROCEDURE — 84484 ASSAY OF TROPONIN QUANT: CPT

## 2022-10-26 PROCEDURE — 80053 COMPREHEN METABOLIC PANEL: CPT

## 2022-10-26 PROCEDURE — 71045 X-RAY EXAM CHEST 1 VIEW: CPT

## 2022-10-26 PROCEDURE — 36415 COLL VENOUS BLD VENIPUNCTURE: CPT

## 2022-10-26 PROCEDURE — 72156 MRI NECK SPINE W/O & W/DYE: CPT

## 2022-10-26 PROCEDURE — 82948 REAGENT STRIP/BLOOD GLUCOSE: CPT

## 2022-10-26 PROCEDURE — 86140 C-REACTIVE PROTEIN: CPT

## 2022-10-26 PROCEDURE — 82550 ASSAY OF CK (CPK): CPT

## 2022-10-26 PROCEDURE — 85025 COMPLETE CBC W/AUTO DIFF WBC: CPT

## 2022-10-26 PROCEDURE — 81001 URINALYSIS AUTO W/SCOPE: CPT

## 2022-10-26 RX ADMIN — DEXAMETHASONE SCH MG: 4 TABLET ORAL at 20:34

## 2022-10-26 RX ADMIN — SODIUM CHLORIDE SCH UNIT: 9 INJECTION, SOLUTION INTRAVENOUS at 21:00

## 2022-10-26 RX ADMIN — SODIUM CHLORIDE SCH UNIT: 9 INJECTION, SOLUTION INTRAVENOUS at 16:30

## 2022-10-27 VITALS — DIASTOLIC BLOOD PRESSURE: 68 MMHG | SYSTOLIC BLOOD PRESSURE: 137 MMHG

## 2022-10-27 VITALS — SYSTOLIC BLOOD PRESSURE: 154 MMHG | DIASTOLIC BLOOD PRESSURE: 76 MMHG

## 2022-10-27 VITALS — SYSTOLIC BLOOD PRESSURE: 164 MMHG | DIASTOLIC BLOOD PRESSURE: 87 MMHG

## 2022-10-27 VITALS — SYSTOLIC BLOOD PRESSURE: 139 MMHG | DIASTOLIC BLOOD PRESSURE: 79 MMHG

## 2022-10-27 VITALS — DIASTOLIC BLOOD PRESSURE: 83 MMHG | SYSTOLIC BLOOD PRESSURE: 162 MMHG

## 2022-10-27 RX ADMIN — SODIUM CHLORIDE SCH UNIT: 9 INJECTION, SOLUTION INTRAVENOUS at 06:50

## 2022-10-27 RX ADMIN — SODIUM CHLORIDE SCH UNIT: 9 INJECTION, SOLUTION INTRAVENOUS at 11:30

## 2022-10-27 RX ADMIN — SODIUM CHLORIDE SCH UNIT: 9 INJECTION, SOLUTION INTRAVENOUS at 18:05

## 2022-10-27 RX ADMIN — GABAPENTIN SCH MG: 100 CAPSULE ORAL at 14:49

## 2022-10-27 RX ADMIN — DEXAMETHASONE SCH MG: 4 TABLET ORAL at 09:36

## 2022-10-27 RX ADMIN — GABAPENTIN SCH MG: 100 CAPSULE ORAL at 09:34

## 2024-04-10 ENCOUNTER — HOSPITAL ENCOUNTER (OUTPATIENT)
Dept: HOSPITAL 90 - OIH | Age: 59
Discharge: HOME | End: 2024-04-10
Attending: INTERNAL MEDICINE
Payer: COMMERCIAL

## 2024-04-10 DIAGNOSIS — R93.1: ICD-10-CM

## 2024-04-10 DIAGNOSIS — Z13.6: Primary | ICD-10-CM

## 2024-04-10 DIAGNOSIS — I25.10: ICD-10-CM

## 2024-04-10 PROCEDURE — 75571 CT HRT W/O DYE W/CA TEST: CPT
